# Patient Record
Sex: FEMALE | Race: BLACK OR AFRICAN AMERICAN | Employment: FULL TIME | URBAN - METROPOLITAN AREA
[De-identification: names, ages, dates, MRNs, and addresses within clinical notes are randomized per-mention and may not be internally consistent; named-entity substitution may affect disease eponyms.]

---

## 2024-02-21 ENCOUNTER — OFFICE VISIT (OUTPATIENT)
Dept: OBGYN CLINIC | Age: 39
End: 2024-02-21

## 2024-02-21 VITALS
BODY MASS INDEX: 28.16 KG/M2 | WEIGHT: 153 LBS | HEIGHT: 62 IN | SYSTOLIC BLOOD PRESSURE: 114 MMHG | DIASTOLIC BLOOD PRESSURE: 75 MMHG

## 2024-02-21 DIAGNOSIS — Z11.3 SCREEN FOR STD (SEXUALLY TRANSMITTED DISEASE): ICD-10-CM

## 2024-02-21 DIAGNOSIS — Z11.51 SCREENING FOR HUMAN PAPILLOMAVIRUS (HPV): ICD-10-CM

## 2024-02-21 DIAGNOSIS — N60.02 BREAST CYST, LEFT: ICD-10-CM

## 2024-02-21 DIAGNOSIS — Z12.4 SCREENING FOR CERVICAL CANCER: ICD-10-CM

## 2024-02-21 DIAGNOSIS — Z01.419 WELL WOMAN EXAM WITH ROUTINE GYNECOLOGICAL EXAM: Primary | ICD-10-CM

## 2024-02-24 PROBLEM — N92.0 MENORRHAGIA: Status: ACTIVE | Noted: 2017-06-02

## 2024-02-24 NOTE — PROGRESS NOTES
normocephalic, atraumatic, without any gross head or neck masses.   Neck: Neck exam reveals no abnormalities.   Thyroid ~ 1 cm/symmetric, no abnormalities.   Lymph nodes:   Neck lymph nodes are normal.   No supraclavicular lymphadenopathy noted.   No axillary lymphadenopathy noted.   No groin lymphadenopathy noted.   Respiratory: Assessment of respiratory effort reveals even and non labored respirations.  Lungs CTA.   Cardiovascular: Heart auscultation reveals no murmurs, gallop, rubs or clicks.   Skin: No skin rash, abnormal appearing nevi, subcutaneous nodules, lesions or ulcers observed.   Abdomen: Abdomen soft, non tender, without palpable masses.   Palpation of liver reveals no abnormalities with respect to size, tenderness or masses.   Palpation of spleen reveals no abnormalities with respect to size, tenderness or masses.   Breast: Symmetrical, no: dimpling, retractions, adenopathy, dominant masses or nipple discharge elicited.    Breasts show no palpable masses or tenderness.   Bilateral Fibrocystic change is:   marked   Left breast 3:00, 4 cm from the areolar edge, ? cyst vs prominent FC change vs other  No changes suspicious for malignancy      Genitourinary:  External genitalia are normal in appearance.   Examination of urethral meatus reveals location normal and size normal.   Examination of urethra shows no abnormalities.   Examination of vaginal vault reveals no abnormalities.   Cervix & uterus:  surgically absent (2017 path confirms cervix  w/ the specimen)  Ovaries, adnexa and parametria: no palpable masses, no: organomegaly or nodularity.  Nontender  Examination of anus and perineum shows no abnormalities.   Rectal exam reveals normal sphincter tone without presence of hemorrhoids or masses.     ASSESSMENT and PLAN    1.  AE.  Importance of self breast exam reviewed, pt educated how to perform SBE.      2.  Her PCP is None, None    Patient encouraged to establish care with a PCP.      3.    Covid,

## 2024-02-28 ENCOUNTER — OFFICE VISIT (OUTPATIENT)
Dept: OBGYN CLINIC | Age: 39
End: 2024-02-28

## 2024-02-28 VITALS
BODY MASS INDEX: 28.89 KG/M2 | DIASTOLIC BLOOD PRESSURE: 72 MMHG | WEIGHT: 157 LBS | SYSTOLIC BLOOD PRESSURE: 110 MMHG | HEIGHT: 62 IN

## 2024-02-28 DIAGNOSIS — Z11.3 SCREEN FOR STD (SEXUALLY TRANSMITTED DISEASE): ICD-10-CM

## 2024-02-28 DIAGNOSIS — N75.1 ABSCESS OF LEFT BARTHOLIN'S GLAND: Primary | ICD-10-CM

## 2024-02-28 DIAGNOSIS — A49.1 GROUP B STREPTOCOCCAL INFECTION: ICD-10-CM

## 2024-02-28 NOTE — PROGRESS NOTES
urethral meatus reveals location normal and size normal.   Examination of urethra shows no abnormalities.   Examination of anus and perineum shows no abnormalities.     I&D Abscess Simple  Date/Time:   Performed by: Dr. White  Location:     Type:  Bartholin cyst    Location:  left labia    Anogenital location:  Bartholin's gland  Pre-procedure details:     Skin preparation:  Betadine  Anesthesia (see MAR for exact dosages):     Anesthesia method:  Local infiltration    Local anesthetic:  Lidocaine 1% w/o epi  Procedure type:     Complexity:  Simple  Procedure details:     Needle aspiration: no      Incision types:  Single straight    Incision depth:  Submucosal    Scalpel blade:  11    Wound management:  Probed and deloculated    Drainage:  Purulent tan, no odor    Drainage amount:  copious    Wound treatment:  Word catheter placed  Post-procedure details:     Patient tolerance of procedure:  Tolerated well, no immediate complications  Comments:           Assessment: 1.  Chronic Bartholin's abscess: await cultures. Infection precautions reviewed.    Wound care discussed.  Patient advised it is okay if Word catheter falls out    Plan: Follow-up ~10-14 days or if other concerns develop.  Signs/symptoms of infection reviewed.    Discussed with patient if Bartholin's abscess recurs-->advise marsupialization        Diagnosis Orders   1. Abscess of left Bartholin's gland  amoxicillin (AMOXIL) 875 MG tablet      2. Screen for STD (sexually transmitted disease)  C.trachomatis N.gonorrhoeae DNA    C.trachomatis N.gonorrhoeae DNA      3. Group B streptococcal infection  amoxicillin (AMOXIL) 875 MG tablet            ICD-10-CM ICD-9-CM    1. Vulvar cyst N90.7 624.8 I&D BARTHOLIN GLAND ABSCESS       Orders Placed This Encounter   Procedures    I&D BARTHOLIN GLAND ABSCESS    C.trachomatis N.gonorrhoeae DNA    Culture, Genital    Culture, Anaerobic       Dictated using voice recognition software.  Proofread but unrecognized

## 2024-03-01 LAB
BACTERIA SPEC CULT: NORMAL
SERVICE CMNT-IMP: NORMAL

## 2024-03-01 RX ORDER — AMOXICILLIN 875 MG/1
875 TABLET, COATED ORAL 2 TIMES DAILY
Qty: 20 TABLET | Refills: 0 | Status: SHIPPED | OUTPATIENT
Start: 2024-03-01 | End: 2024-03-11

## 2024-03-02 LAB
BACTERIA SPEC CULT: ABNORMAL
BACTERIA SPEC CULT: ABNORMAL
GRAM STN SPEC: ABNORMAL
GRAM STN SPEC: ABNORMAL
SERVICE CMNT-IMP: ABNORMAL

## 2024-03-05 LAB
C TRACH RRNA SPEC QL NAA+PROBE: NEGATIVE
N GONORRHOEA RRNA SPEC QL NAA+PROBE: NEGATIVE
SPECIMEN SOURCE: NORMAL

## 2024-03-06 LAB
BACTERIA SPEC CULT: NORMAL
SERVICE CMNT-IMP: NORMAL

## 2024-03-12 DIAGNOSIS — N75.1 ABSCESS OF LEFT BARTHOLIN'S GLAND: ICD-10-CM

## 2024-03-12 DIAGNOSIS — A49.1 GROUP B STREPTOCOCCAL INFECTION: ICD-10-CM

## 2024-03-12 RX ORDER — AMOXICILLIN 875 MG/1
875 TABLET, COATED ORAL 2 TIMES DAILY
Qty: 20 TABLET | Refills: 0 | Status: SHIPPED | OUTPATIENT
Start: 2024-03-12 | End: 2024-03-22

## 2024-03-26 ENCOUNTER — HOSPITAL ENCOUNTER (OUTPATIENT)
Dept: MAMMOGRAPHY | Age: 39
Discharge: HOME | End: 2024-03-29
Attending: OBSTETRICS & GYNECOLOGY
Payer: COMMERCIAL

## 2024-03-26 DIAGNOSIS — N60.02 BREAST CYST, LEFT: ICD-10-CM

## 2024-03-26 PROCEDURE — 76642 ULTRASOUND BREAST LIMITED: CPT

## 2024-03-26 PROCEDURE — G0279 TOMOSYNTHESIS, MAMMO: HCPCS

## 2024-03-27 ENCOUNTER — OFFICE VISIT (OUTPATIENT)
Dept: OBGYN CLINIC | Age: 39
End: 2024-03-27
Payer: COMMERCIAL

## 2024-03-27 VITALS
HEIGHT: 62 IN | SYSTOLIC BLOOD PRESSURE: 110 MMHG | WEIGHT: 157 LBS | BODY MASS INDEX: 28.89 KG/M2 | DIASTOLIC BLOOD PRESSURE: 72 MMHG

## 2024-03-27 DIAGNOSIS — N75.1 ABSCESS OF LEFT BARTHOLIN'S GLAND: Primary | ICD-10-CM

## 2024-03-27 PROCEDURE — 99213 OFFICE O/P EST LOW 20 MIN: CPT | Performed by: OBSTETRICS & GYNECOLOGY

## 2024-03-27 PROCEDURE — 99459 PELVIC EXAMINATION: CPT | Performed by: OBSTETRICS & GYNECOLOGY

## 2024-03-27 RX ORDER — METRONIDAZOLE 500 MG/1
500 TABLET ORAL 2 TIMES DAILY
Qty: 14 TABLET | Refills: 0 | Status: SHIPPED | OUTPATIENT
Start: 2024-03-27 | End: 2024-04-03

## 2024-04-01 NOTE — PROGRESS NOTES
Jessica Nelson  38 y.o.  1985  [id number]    Today: 3/27/24          Chief Complaint   Patient presents with    Follow-up     Recheck bartholin cyst     Subjective:    38 y.o. , follow-up Bartholin's cyst I&D.  Per patient the word catheter fell out 1 week post placement.    For 2 days she had no drainage.  On the 3rd day she was at work and there are cyst \"exploded\".  She drained pus and blood and noted an odor.  Denies fever, had no further problems    No LMP recorded. Patient has had a hysterectomy.      OB History    Para Term  AB Living   5 4 3 1 1 0   SAB IAB Ectopic Molar Multiple Live Births   0 1 0 0 0 0   Obstetric Comments    IAB   2009  30 wk stillborn, boy       No Known Allergies    Current Outpatient Medications   Medication Sig    metroNIDAZOLE (FLAGYL) 500 MG tablet Take 1 tablet by mouth 2 times daily for 7 days     No current facility-administered medications for this visit.       Past Medical History:   Diagnosis Date    Chlamydia     Fibroid, uterine     Trichomonas vaginitis 2021    on pap smear       Past Surgical History:   Procedure Laterality Date    BARTHOLIN GLAND CYST EXCISION  2024    I & D abscess, word catheter placed.  Cyst was NOT excised. Per pt h/o prev I &D Bartholin's years ago, can't recall laterality    ROBOTIC ASSISTED HYSTERECTOMY  2017    Mello Nicholson MD, [facility], menorrhagia, fibroids       Social History     Socioeconomic History    Marital status: Single     Spouse name: None    Number of children: None    Years of education: None    Highest education level: None   Tobacco Use    Smoking status: Never    Smokeless tobacco: Never   Vaping Use    Vaping Use: Never used   Substance and Sexual Activity    Alcohol use: Yes    Drug use: Never       Family History   Problem Relation Age of Onset    Prostate Cancer Neg Hx     Deep Vein Thrombosis Neg Hx     Pulmonary Embolism Neg Hx     Ovarian Cancer Neg Hx     Colon Cancer

## 2024-04-09 ENCOUNTER — TELEPHONE (OUTPATIENT)
Dept: OBGYN CLINIC | Age: 39
End: 2024-04-09

## 2024-04-10 ENCOUNTER — TELEPHONE (OUTPATIENT)
Dept: OBGYN CLINIC | Age: 39
End: 2024-04-10

## 2024-04-10 NOTE — TELEPHONE ENCOUNTER
Called and talked to Guido about some disability papers we received for completion. She stated that the FMLA/ Disability was denied and no longer needs the papers completed.  Forms were shredded